# Patient Record
(demographics unavailable — no encounter records)

---

## 2025-03-03 NOTE — PHYSICAL EXAM
[No Acute Distress] : no acute distress [Well Nourished] : well nourished [Well Developed] : well developed [Well-Appearing] : well-appearing [No Respiratory Distress] : no respiratory distress  [No Accessory Muscle Use] : no accessory muscle use [Normal Rate] : normal rate  [Regular Rhythm] : with a regular rhythm [Normal] : no joint swelling and grossly normal strength and tone [Normal Affect] : the affect was normal [Alert and Oriented x3] : oriented to person, place, and time [Normal Mood] : the mood was normal [Normal Insight/Judgement] : insight and judgment were intact

## 2025-03-03 NOTE — HEALTH RISK ASSESSMENT
[No] : No [0] : 2) Feeling down, depressed, or hopeless: Not at all (0) [Never] : Never [[id number]] : 0

## 2025-03-03 NOTE — HISTORY OF PRESENT ILLNESS
[FreeTextEntry1] :  I need a refill on my medication [de-identified] :  Ms. Rhodes presents today with no distress or complaints informing that she is in need of refills for her Zepbound.  She states that she feels well today and has had no recent acute illness. She informs that she has been on Zepbound for one year. She has lost 65 lbs.thus far. She only wants to lose maybe another 15 pounds.